# Patient Record
Sex: MALE | URBAN - METROPOLITAN AREA
[De-identification: names, ages, dates, MRNs, and addresses within clinical notes are randomized per-mention and may not be internally consistent; named-entity substitution may affect disease eponyms.]

---

## 2023-10-09 ENCOUNTER — HOSPITAL ENCOUNTER (OUTPATIENT)
Age: 23
Discharge: HOME | End: 2023-10-09

## 2023-10-09 PROCEDURE — 86481 TB AG RESPONSE T-CELL SUSP: CPT

## 2023-10-13 LAB — T-SPOT TB TEST: NORMAL

## 2025-03-24 SDOH — HEALTH STABILITY: PHYSICAL HEALTH: ON AVERAGE, HOW MANY MINUTES DO YOU ENGAGE IN EXERCISE AT THIS LEVEL?: 30 MIN

## 2025-03-24 SDOH — HEALTH STABILITY: PHYSICAL HEALTH: ON AVERAGE, HOW MANY DAYS PER WEEK DO YOU ENGAGE IN MODERATE TO STRENUOUS EXERCISE (LIKE A BRISK WALK)?: 1 DAY

## 2025-03-25 ENCOUNTER — OFFICE VISIT (OUTPATIENT)
Age: 25
End: 2025-03-25
Payer: COMMERCIAL

## 2025-03-25 ENCOUNTER — HOSPITAL ENCOUNTER (OUTPATIENT)
Age: 25
Setting detail: SPECIMEN
Discharge: HOME | End: 2025-03-25

## 2025-03-25 ENCOUNTER — HOSPITAL ENCOUNTER (OUTPATIENT)
Age: 25
Discharge: HOME | End: 2025-03-27
Payer: COMMERCIAL

## 2025-03-25 ENCOUNTER — HOSPITAL ENCOUNTER (OUTPATIENT)
Dept: GENERAL RADIOLOGY | Age: 25
Discharge: HOME | End: 2025-03-27
Payer: COMMERCIAL

## 2025-03-25 VITALS
BODY MASS INDEX: 41.18 KG/M2 | HEIGHT: 69 IN | RESPIRATION RATE: 16 BRPM | SYSTOLIC BLOOD PRESSURE: 120 MMHG | DIASTOLIC BLOOD PRESSURE: 77 MMHG | WEIGHT: 278 LBS | HEART RATE: 93 BPM | TEMPERATURE: 98.4 F

## 2025-03-25 DIAGNOSIS — J06.9 VIRAL URI: ICD-10-CM

## 2025-03-25 DIAGNOSIS — E78.5 HYPERLIPIDEMIA, UNSPECIFIED HYPERLIPIDEMIA TYPE: ICD-10-CM

## 2025-03-25 DIAGNOSIS — J06.9 VIRAL URI: Primary | ICD-10-CM

## 2025-03-25 DIAGNOSIS — I10 ELEVATED BLOOD PRESSURE READING IN OFFICE WITH DIAGNOSIS OF HYPERTENSION: ICD-10-CM

## 2025-03-25 LAB
ALBUMIN SERPL-MCNC: 4.4 G/DL (ref 3.5–5.2)
ALBUMIN/GLOB SERPL: 1.5 {RATIO} (ref 1–2.5)
ALP SERPL-CCNC: 112 U/L (ref 40–129)
ALT SERPL-CCNC: 88 U/L (ref 10–50)
ANION GAP SERPL CALCULATED.3IONS-SCNC: 11 MMOL/L (ref 9–16)
AST SERPL-CCNC: 39 U/L (ref 10–50)
BASOPHILS # BLD: 0.08 K/UL (ref 0–0.2)
BASOPHILS NFR BLD: 1 % (ref 0–2)
BILIRUB SERPL-MCNC: 1.9 MG/DL (ref 0–1.2)
BUN SERPL-MCNC: 9 MG/DL (ref 6–20)
CALCIUM SERPL-MCNC: 9.4 MG/DL (ref 8.6–10.4)
CHLORIDE SERPL-SCNC: 103 MMOL/L (ref 98–107)
CHOLEST SERPL-MCNC: 209 MG/DL (ref 0–199)
CHOLESTEROL/HDL RATIO: 6
CO2 SERPL-SCNC: 24 MMOL/L (ref 20–31)
CREAT SERPL-MCNC: 0.9 MG/DL (ref 0.7–1.2)
EOSINOPHIL # BLD: 0.09 K/UL (ref 0–0.44)
EOSINOPHILS RELATIVE PERCENT: 1 % (ref 1–4)
ERYTHROCYTE [DISTWIDTH] IN BLOOD BY AUTOMATED COUNT: 13.1 % (ref 11.8–14.4)
GFR, ESTIMATED: >90 ML/MIN/1.73M2
GLUCOSE SERPL-MCNC: 99 MG/DL (ref 74–99)
HCT VFR BLD AUTO: 43.3 % (ref 40.7–50.3)
HDLC SERPL-MCNC: 35 MG/DL
HGB BLD-MCNC: 14 G/DL (ref 13–17)
IMM GRANULOCYTES # BLD AUTO: 0.04 K/UL (ref 0–0.3)
IMM GRANULOCYTES NFR BLD: 1 %
LDLC SERPL CALC-MCNC: 147 MG/DL (ref 0–100)
LYMPHOCYTES NFR BLD: 1.31 K/UL (ref 1.1–3.7)
LYMPHOCYTES RELATIVE PERCENT: 20 % (ref 24–43)
MCH RBC QN AUTO: 26.4 PG (ref 25.2–33.5)
MCHC RBC AUTO-ENTMCNC: 32.3 G/DL (ref 28.4–34.8)
MCV RBC AUTO: 81.7 FL (ref 82.6–102.9)
MONOCYTES NFR BLD: 1.06 K/UL (ref 0.1–1.2)
MONOCYTES NFR BLD: 16 % (ref 3–12)
NEUTROPHILS NFR BLD: 61 % (ref 36–65)
NEUTS SEG NFR BLD: 3.92 K/UL (ref 1.5–8.1)
NRBC BLD-RTO: 0 PER 100 WBC
PLATELET # BLD AUTO: 216 K/UL (ref 138–453)
PMV BLD AUTO: 11 FL (ref 8.1–13.5)
POTASSIUM SERPL-SCNC: 4.1 MMOL/L (ref 3.7–5.3)
PROT SERPL-MCNC: 7.3 G/DL (ref 6.6–8.7)
RBC # BLD AUTO: 5.3 M/UL (ref 4.21–5.77)
RBC # BLD: ABNORMAL 10*6/UL
SODIUM SERPL-SCNC: 138 MMOL/L (ref 136–145)
TRIGL SERPL-MCNC: 134 MG/DL
VLDLC SERPL CALC-MCNC: 27 MG/DL (ref 1–30)
WBC OTHER # BLD: 6.5 K/UL (ref 3.5–11.3)

## 2025-03-25 PROCEDURE — 71046 X-RAY EXAM CHEST 2 VIEWS: CPT

## 2025-03-25 PROCEDURE — 3078F DIAST BP <80 MM HG: CPT

## 2025-03-25 PROCEDURE — 99203 OFFICE O/P NEW LOW 30 MIN: CPT

## 2025-03-25 PROCEDURE — 99202 OFFICE O/P NEW SF 15 MIN: CPT

## 2025-03-25 PROCEDURE — 3074F SYST BP LT 130 MM HG: CPT

## 2025-03-25 RX ORDER — HYDROXYZINE HYDROCHLORIDE 10 MG/1
10 TABLET, FILM COATED ORAL
COMMUNITY
Start: 2025-03-13

## 2025-03-25 RX ORDER — BLOOD PRESSURE TEST KIT
1 KIT MISCELLANEOUS DAILY
Qty: 1 KIT | Refills: 0 | Status: SHIPPED | OUTPATIENT
Start: 2025-03-25

## 2025-03-25 RX ORDER — TRIAMCINOLONE ACETONIDE 1 MG/G
OINTMENT TOPICAL
COMMUNITY
Start: 2025-03-12

## 2025-03-25 SDOH — ECONOMIC STABILITY: FOOD INSECURITY: WITHIN THE PAST 12 MONTHS, THE FOOD YOU BOUGHT JUST DIDN'T LAST AND YOU DIDN'T HAVE MONEY TO GET MORE.: NEVER TRUE

## 2025-03-25 SDOH — ECONOMIC STABILITY: FOOD INSECURITY: WITHIN THE PAST 12 MONTHS, YOU WORRIED THAT YOUR FOOD WOULD RUN OUT BEFORE YOU GOT MONEY TO BUY MORE.: NEVER TRUE

## 2025-03-25 ASSESSMENT — PATIENT HEALTH QUESTIONNAIRE - PHQ9
2. FEELING DOWN, DEPRESSED OR HOPELESS: SEVERAL DAYS
SUM OF ALL RESPONSES TO PHQ QUESTIONS 1-9: 1
1. LITTLE INTEREST OR PLEASURE IN DOING THINGS: NOT AT ALL
SUM OF ALL RESPONSES TO PHQ QUESTIONS 1-9: 1

## 2025-03-25 NOTE — PROGRESS NOTES
[facility]  [address] [place] [address]  Phone: [phone]  Fax: [phone]      Date of Visit: 3/25/2025   Patient Name: Cristian Holloway   Patient :  2000     ASSESSMENT/PLAN   1. Viral URI  -     XR CHEST STANDARD (2 VW); Future  -     CBC with Auto Differential; Future  2. Elevated blood pressure reading in office with diagnosis of hypertension  -     Blood Pressure KIT; DAILY Starting Tue 3/25/2025, Disp-1 kit, R-0, Print  3. Hyperlipidemia, unspecified hyperlipidemia type  -     Lipid Panel; Future  -     Comprehensive Metabolic Panel; Future       Patient Instructions   Thank you for following up with us at [facility] office. It was a pleasure to meet you today!     Our plan is the following:  As discussed I have ordered a chest x-ray for you to have done.  Please have this done at your convenience and I will follow-up with the results with you.  Additionally I would advise continued supportive care for your cough at this time. A cough can last for several weeks.  But if your symptoms worsen or you develop new concerning symptoms please give our office a call to be further evaluated or go to the nearest emergency department to be further evaluated.  Additionally have ordered some lab work which includes CBC, CMP, lipid panel please have this done at your convenience and I will review the results.  Additionally as discussed please track your blood pressures at home.  Revised taking blood pressures every other day once in the morning and once in the afternoon and we can review the results at your next visit.  That she make an appointment to come back in a month to have a full physical exam at that time.    Your medication list is included in the after visit summary. If you find any differences when compared to your medications at home, please contact the office ([phone]) to let us

## 2025-03-25 NOTE — PATIENT INSTRUCTIONS
Thank you for following up with us at [facility] office. It was a pleasure to meet you today!     Our plan is the following:  As discussed I have ordered a chest x-ray for you to have done.  Please have this done at your convenience and I will follow-up with the results with you.  Additionally I would advise continued supportive care for your cough at this time. A cough can last for several weeks.  But if your symptoms worsen or you develop new concerning symptoms please give our office a call to be further evaluated or go to the nearest emergency department to be further evaluated.  Additionally have ordered some lab work which includes CBC, CMP, lipid panel please have this done at your convenience and I will review the results.  Additionally as discussed please track your blood pressures at home.  Revised taking blood pressures every other day once in the morning and once in the afternoon and we can review the results at your next visit.  That she make an appointment to come back in a month to have a full physical exam at that time.    Your medication list is included in the after visit summary. If you find any differences when compared to your medications at home, please contact the office ([phone]) to let us know.   You can also call the office if you have any additional questions or concerns and speak to one of the staff. They will triage and forward the message to the provider.   Have a great rest of your day!

## 2025-03-26 ENCOUNTER — TELEPHONE (OUTPATIENT)
Age: 25
End: 2025-03-26

## 2025-03-26 NOTE — TELEPHONE ENCOUNTER
Patient calling to request a phone call with Dr. Mueller to talk about his lab results. States he had seen them on MyChart and was concerned. Please advise

## 2025-03-28 NOTE — TELEPHONE ENCOUNTER
Spoke to patient about his lab results. Went over his lipid panel, CMP, CBC, and chest x-ray results. Explained to patient his CMP and CBC results are grossly WNLs. His lipid panel showed a slightly high cholesterol of 209, HDL was 35, and LDL was 147. Per ASCVD risk algorithm no statin therapy is recommended at this time. Advised lifestyle modifications such as improving his diet, weight loss, increasing exercise.

## 2025-04-13 NOTE — PROGRESS NOTES
Exercise Vital Sign     Days of Exercise per Week: 1 day     Minutes of Exercise per Session: 30 min   Stress: Stress Concern Present (3/3/2025)    Received from [facility]    [employer] - Occupational Stress Questionnaire     Feeling of Stress : To some extent   Social Connections: Socially Isolated (3/3/2025)    Received from [facility]    Social Connection and Isolation Panel [NHANES]     Frequency of Communication with Friends and Family: More than three times a week     Frequency of Social Gatherings with Friends and Family: Once a week     Attends Sikhism Services: Never     Active Member of Clubs or Organizations: No     Attends Club or Organization Meetings: Never     Marital Status: Never    Housing Stability: Low Risk  (3/25/2025)    Housing Stability Vital Sign     Unable to Pay for Housing in the Last Year: No     Number of Times Moved in the Last Year: 0     Homeless in the Last Year: No

## 2025-04-15 ENCOUNTER — OFFICE VISIT (OUTPATIENT)
Age: 25
End: 2025-04-15
Payer: COMMERCIAL

## 2025-04-15 VITALS
DIASTOLIC BLOOD PRESSURE: 74 MMHG | HEIGHT: 69 IN | WEIGHT: 270 LBS | TEMPERATURE: 97.6 F | BODY MASS INDEX: 39.99 KG/M2 | HEART RATE: 83 BPM | SYSTOLIC BLOOD PRESSURE: 138 MMHG | RESPIRATION RATE: 18 BRPM

## 2025-04-15 DIAGNOSIS — Z23 NEED FOR HPV VACCINATION: ICD-10-CM

## 2025-04-15 DIAGNOSIS — Z87.19 HISTORY OF FATTY INFILTRATION OF LIVER: ICD-10-CM

## 2025-04-15 DIAGNOSIS — R17 TOTAL BILIRUBIN, ELEVATED: ICD-10-CM

## 2025-04-15 DIAGNOSIS — Z11.4 SCREENING FOR HIV (HUMAN IMMUNODEFICIENCY VIRUS): ICD-10-CM

## 2025-04-15 DIAGNOSIS — Z00.01 ENCOUNTER FOR WELL ADULT EXAM WITH ABNORMAL FINDINGS: Primary | ICD-10-CM

## 2025-04-15 DIAGNOSIS — Z11.3 SCREENING FOR STD (SEXUALLY TRANSMITTED DISEASE): ICD-10-CM

## 2025-04-15 PROCEDURE — 99212 OFFICE O/P EST SF 10 MIN: CPT

## 2025-04-15 PROCEDURE — 90651 9VHPV VACCINE 2/3 DOSE IM: CPT | Performed by: STUDENT IN AN ORGANIZED HEALTH CARE EDUCATION/TRAINING PROGRAM

## 2025-04-15 PROCEDURE — 99395 PREV VISIT EST AGE 18-39: CPT

## 2025-04-15 RX ORDER — BUSPIRONE HYDROCHLORIDE 10 MG/1
10 TABLET ORAL 2 TIMES DAILY
COMMUNITY
Start: 2025-04-01 | End: 2026-04-01

## 2025-04-15 RX ORDER — APREMILAST 30 MG/1
1 TABLET, FILM COATED ORAL 2 TIMES DAILY
COMMUNITY

## 2025-04-15 NOTE — PATIENT INSTRUCTIONS
Thank you for following up with us at [facility] office. It was a pleasure to meet you today!     Our plan is the following:  As discussed I have placed orders to screen for STDs given your history of having multiple sexual partners.  Additionally I have ordered blood work to further evaluate your history of fatty liver. I have ordered a hepatic function panel as well as a right upper quadrant ultrasound to examine your liver and gallbladder. Please have this done before your next visit so we can go over it.  Additionally, I would like you to follow-up in 1 month for follow-up of your lab work and imaging results.    Your medication list is included in the after visit summary. If you find any differences when compared to your medications at home, please contact the office ([phone]) to let us know.   You can also call the office if you have any additional questions or concerns and speak to one of the staff. They will triage and forward the message to the provider.   Have a great rest of your day!

## 2025-05-21 ENCOUNTER — HOSPITAL ENCOUNTER (OUTPATIENT)
Age: 25
Discharge: HOME | End: 2025-05-23
Payer: COMMERCIAL

## 2025-05-21 ENCOUNTER — HOSPITAL ENCOUNTER (OUTPATIENT)
Dept: ULTRASOUND IMAGING | Age: 25
Discharge: HOME | End: 2025-05-23
Payer: COMMERCIAL

## 2025-05-21 ENCOUNTER — HOSPITAL ENCOUNTER (OUTPATIENT)
Dept: GENERAL RADIOLOGY | Age: 25
Discharge: HOME | End: 2025-05-23
Payer: COMMERCIAL

## 2025-05-21 DIAGNOSIS — N20.0 CALCULUS OF KIDNEY: ICD-10-CM

## 2025-05-21 DIAGNOSIS — Z87.19 HISTORY OF FATTY INFILTRATION OF LIVER: ICD-10-CM

## 2025-05-21 PROCEDURE — 76705 ECHO EXAM OF ABDOMEN: CPT

## 2025-05-21 PROCEDURE — 74018 RADEX ABDOMEN 1 VIEW: CPT

## 2025-05-22 ENCOUNTER — HOSPITAL ENCOUNTER (OUTPATIENT)
Age: 25
Setting detail: SPECIMEN
Discharge: HOME | End: 2025-05-22

## 2025-05-22 DIAGNOSIS — Z11.4 SCREENING FOR HIV (HUMAN IMMUNODEFICIENCY VIRUS): ICD-10-CM

## 2025-05-22 DIAGNOSIS — Z87.19 HISTORY OF FATTY INFILTRATION OF LIVER: ICD-10-CM

## 2025-05-22 DIAGNOSIS — Z11.3 SCREENING FOR STD (SEXUALLY TRANSMITTED DISEASE): ICD-10-CM

## 2025-05-22 LAB
HIV 1+2 AB+HIV1 P24 AG SERPL QL IA: NONREACTIVE
T PALLIDUM AB SER QL IA: NONREACTIVE

## 2025-05-23 LAB
ALBUMIN SERPL-MCNC: 4.5 G/DL (ref 3.5–5.2)
ALBUMIN/GLOB SERPL: 1.7 {RATIO} (ref 1–2.5)
ALP SERPL-CCNC: 109 U/L (ref 40–129)
ALT SERPL-CCNC: 80 U/L (ref 10–50)
AST SERPL-CCNC: 31 U/L (ref 10–50)
BILIRUB DIRECT SERPL-MCNC: 0.4 MG/DL (ref 0–0.2)
BILIRUB INDIRECT SERPL-MCNC: 0.9 MG/DL (ref 0–1)
BILIRUB SERPL-MCNC: 1.3 MG/DL (ref 0–1.2)
CHLAMYDIA DNA UR QL NAA+PROBE: NEGATIVE
GLOBULIN SER CALC-MCNC: 2.6 G/DL
N GONORRHOEA DNA UR QL NAA+PROBE: NEGATIVE
PROT SERPL-MCNC: 7.1 G/DL (ref 6.6–8.7)
SPECIMEN DESCRIPTION: NORMAL

## 2025-05-27 ENCOUNTER — OFFICE VISIT (OUTPATIENT)
Age: 25
End: 2025-05-27
Payer: COMMERCIAL

## 2025-05-27 VITALS
SYSTOLIC BLOOD PRESSURE: 137 MMHG | TEMPERATURE: 97.9 F | RESPIRATION RATE: 14 BRPM | DIASTOLIC BLOOD PRESSURE: 75 MMHG | WEIGHT: 269.4 LBS | HEIGHT: 69 IN | HEART RATE: 96 BPM | BODY MASS INDEX: 39.9 KG/M2

## 2025-05-27 DIAGNOSIS — Z87.891 FORMER SMOKER: ICD-10-CM

## 2025-05-27 DIAGNOSIS — R03.0 ELEVATED BLOOD PRESSURE READING: ICD-10-CM

## 2025-05-27 DIAGNOSIS — K76.0 NON-ALCOHOLIC FATTY LIVER DISEASE: Primary | ICD-10-CM

## 2025-05-27 PROCEDURE — 99213 OFFICE O/P EST LOW 20 MIN: CPT

## 2025-05-27 ASSESSMENT — PATIENT HEALTH QUESTIONNAIRE - PHQ9
SUM OF ALL RESPONSES TO PHQ QUESTIONS 1-9: 0
1. LITTLE INTEREST OR PLEASURE IN DOING THINGS: NOT AT ALL
SUM OF ALL RESPONSES TO PHQ QUESTIONS 1-9: 0
2. FEELING DOWN, DEPRESSED OR HOPELESS: NOT AT ALL

## 2025-05-27 NOTE — PATIENT INSTRUCTIONS
Thank you for following up with us at [facility] office. It was a pleasure to meet you today!     Our plan is the following:  As discussed continue to make lifestyle changes. Recommend cutting down on fatty foods and eating out a lot. Would recommend following a healthier diet.  Attached some reading on DASH diet please look into this.  In addition I have ordered some additional blood work to rule out other causes of your fatty liver. Please have this blood work done and can review the results at your next visit.      Your medication list is included in the after visit summary. If you find any differences when compared to your medications at home, please contact the office ([phone]) to let us know.   You can also call the office if you have any additional questions or concerns and speak to one of the staff. They will triage and forward the message to the provider.   Have a great rest of your day!

## 2025-05-27 NOTE — PROGRESS NOTES
[facility]  [address] [place] [address]  Phone: [phone]  Fax: [phone]      Date of Visit: 2025   Patient Name: Cristian Holloway   Patient :  2000     ASSESSMENT/PLAN   1. Non-alcoholic fatty liver disease  -     MARICRUZ; Future  -     Smooth Muscle Antibody Quant; Future  -     Alpha-1-Antitrypsin; Future  -     Ceruloplasmin; Future  2. Elevated blood pressure reading       - Will have patient implement lifestyle modifications including diet and exercise to improve blood pressures.        - Will have patient continue to monitor blood pressures at home.        - Will evaluate blood pressures at follow up visit and if blood pressures remain elevated will consider starting antihypertensive medication and do further work up to rule out possible secondary cause of hypertension  3. Former smoker       Patient Instructions   Thank you for following up with us at [facility] office. It was a pleasure to meet you today!     Our plan is the following:  As discussed continue to make lifestyle changes. Recommend cutting down on fatty foods and eating out a lot. Would recommend following a healthier diet.  Attached some reading on DASH diet please look into this.  In addition I have ordered some additional blood work to rule out other causes of your fatty liver. Please have this blood work done and can review the results at your next visit.      Your medication list is included in the after visit summary. If you find any differences when compared to your medications at home, please contact the office ([phone]) to let us know.   You can also call the office if you have any additional questions or concerns and speak to one of the staff. They will triage and forward the message to the provider.   Have a great rest of your day!       Return in about 1 month (around 2025) for Blood pressure

## 2025-05-29 ENCOUNTER — HOSPITAL ENCOUNTER (OUTPATIENT)
Age: 25
Setting detail: SPECIMEN
Discharge: HOME | End: 2025-05-29

## 2025-05-29 DIAGNOSIS — K76.0 NON-ALCOHOLIC FATTY LIVER DISEASE: ICD-10-CM

## 2025-05-29 LAB
A1AT SERPL-MCNC: 149 MG/DL (ref 90–200)
ANA SER QL IA: NEGATIVE
CERULOPLASMIN SERPL-MCNC: 31 MG/DL (ref 15–30)
DSDNA IGG SER QL IA: <0.5 IU/ML
NUCLEAR IGG SER IA-RTO: <0.1 U/ML

## 2025-05-31 LAB — SMOOTH MUSCLE ANTIBODY: 4 UNITS (ref 0–19)

## 2025-07-24 ENCOUNTER — HOSPITAL ENCOUNTER (OUTPATIENT)
Age: 25
Setting detail: SPECIMEN
Discharge: HOME | End: 2025-07-24

## 2025-07-24 LAB
HBV SURFACE AB SERPL IA-ACNC: <3.5 MIU/ML
HBV SURFACE AG SERPL QL IA: NONREACTIVE
HCV AB SERPL QL IA: NONREACTIVE

## 2025-07-25 LAB
GAMMA INTERFERON BACKGROUND BLD IA-ACNC: 0.08 IU/ML
M TB IFN-G BLD-IMP: NEGATIVE IU/ML
M TB IFN-G CD4+ BCKGRND COR BLD-ACNC: 0.01 IU/ML (ref 0–0.34)
M TB IFN-G CD4+CD8+ BCKGRND COR BLD-ACNC: 0.02 IU/ML (ref 0–0.34)
MITOGEN IGNF BCKGRD COR BLD-ACNC: 9.92 IU/ML

## 2025-07-28 NOTE — PROGRESS NOTES
[facility]  [address] [place] [address]  Phone: [phone]  Fax: [phone]      Date of Visit: 2025   Patient Name: Cristian Holloway   Patient :  2000     ASSESSMENT/PLAN   1. Non-alcoholic fatty liver disease        -     Patient's workup for nonalcoholic fatty liver disease and elevated total bilirubin has been unrevealing so far.  -     [facility].  2. Class 3 severe obesity with serious comorbidity and body mass index (BMI) of 40.0 to 44.9 in adult, unspecified obesity type (HCC)  -    Will consider starting patient on Wegovy once he has been evaluated by gastroenterology for his elevated bilirubin.  3. Nonsmoker  4. Elevated blood pressure reading in office with diagnosis of hypertension   - Patient's initial blood pressure in office 147/57, repeat 128/61. Patient asymptomatic. Patient was advised to keep track of blood pressures after his previous office visit after implementing lifestyle changes. Patient has not been recording his blood pressures at home. Advised patient to log blood pressures at home after implementing lifestyle changes and bring in his blood pressure logs along with his blood pressure cuff to his next office visit to see how his baseline blood pressures are at home to see if he needs to be on any medication and to determine if further workup needs to be done.    Patient Instructions   Thank you for following up with us at [facility] office. It was a pleasure to meet you today!     Our plan is the following:  As discussed I have placed a referral to gastroenterology for further evaluation of your nonalcoholic fatty liver disease.  Please call and make an appointment to follow-up with them.  Additionally as discussed I have placed an order for Wegovy to help with weight management.  Please pick

## 2025-07-29 ENCOUNTER — TELEPHONE (OUTPATIENT)
Age: 25
End: 2025-07-29

## 2025-07-29 ENCOUNTER — OFFICE VISIT (OUTPATIENT)
Age: 25
End: 2025-07-29
Payer: COMMERCIAL

## 2025-07-29 VITALS
BODY MASS INDEX: 43.69 KG/M2 | TEMPERATURE: 97.6 F | HEIGHT: 69 IN | DIASTOLIC BLOOD PRESSURE: 61 MMHG | SYSTOLIC BLOOD PRESSURE: 128 MMHG | HEART RATE: 79 BPM | WEIGHT: 295 LBS | RESPIRATION RATE: 16 BRPM

## 2025-07-29 DIAGNOSIS — I10 ELEVATED BLOOD PRESSURE READING IN OFFICE WITH DIAGNOSIS OF HYPERTENSION: ICD-10-CM

## 2025-07-29 DIAGNOSIS — Z78.9 NONSMOKER: ICD-10-CM

## 2025-07-29 DIAGNOSIS — K76.0 NON-ALCOHOLIC FATTY LIVER DISEASE: Primary | ICD-10-CM

## 2025-07-29 DIAGNOSIS — E66.813 CLASS 3 SEVERE OBESITY WITH SERIOUS COMORBIDITY AND BODY MASS INDEX (BMI) OF 40.0 TO 44.9 IN ADULT, UNSPECIFIED OBESITY TYPE (HCC): ICD-10-CM

## 2025-07-29 PROBLEM — E66.9 OBESITY, UNSPECIFIED: Status: ACTIVE | Noted: 2025-07-29

## 2025-07-29 PROCEDURE — 3074F SYST BP LT 130 MM HG: CPT

## 2025-07-29 PROCEDURE — 3078F DIAST BP <80 MM HG: CPT

## 2025-07-29 PROCEDURE — 99213 OFFICE O/P EST LOW 20 MIN: CPT

## 2025-07-29 NOTE — TELEPHONE ENCOUNTER
Patient called and stated that his insurance does not cover for him to take the Wegovy, but he stated that [facility] is [facility] and pay out of pocket.

## 2025-07-29 NOTE — PATIENT INSTRUCTIONS
Thank you for following up with us at [facility] office. It was a pleasure to meet you today!     Our plan is the following:  As discussed I have placed a referral to gastroenterology for further evaluation of your nonalcoholic fatty liver disease.  Please call and make an appointment to follow-up with them.  Additionally as discussed I have placed an order for Wegovy to help with weight management.  Please pick this up and take as indicated.  Hopefully this gets covered by your insurance as you have stated that your insurance covers it for antiobesity medication.  It is very important that you are maintaining your protein intake as the GLP-1 will indiscriminately help you lose weight (so you are at risk of losing lean muscle as well as fat).  When eating, it is important to prioritize protein first and then veggies and then fruits so you are getting nutrition.  Strength training will also be important for you to do so that you are keeping your muscles. This will help keep your bones strong and help maintain a normal basal metabolic rate (BMR - also referred to as \"your metabolism\").  I recommend getting a smart scale which is a scale that can detect the percent body fat and percent skeletal muscle you have in addition to your weight. By having this type of a scale, you'll be able to see if you are losing skeletal muscle (which is your lean muscle). If we see that you are losing too much skeletal muscle, we need to make significant changes to your diet or decrease the dose of the GLP-1 to make sure you are staying healthy on your weight loss journey.     You had elevated blood pressure in the office today to. Please continue to log your blood pressures at home given you were recommended to incorporate lifestyle changes and diet at your last visit.  I would like to see how your blood pressures are at home with these changes. Please remember to bring your blood pressure logs and

## 2025-07-31 NOTE — TELEPHONE ENCOUNTER
Placed completed Buderer prescription for semaglutide in the resident outbox to be faxed. Please let patient know once it has been faxed.

## 2025-07-31 NOTE — TELEPHONE ENCOUNTER
I have completed his Buderer prescription and placed it in the resident outbox to be faxed. Please let patient know as well.

## 2025-07-31 NOTE — TELEPHONE ENCOUNTER
Disregard. Patient states it is taking too long to be sent to pharmacy. Patient is now asking for Buderer script to be sent per previous message. Will fax script once I get form from resident outbox.

## 2025-07-31 NOTE — TELEPHONE ENCOUNTER
Patient is now no longer wanting to get Buderer compound. States he would like to have Wegovy sent back to his pharmacy because he has a manufacturing coupon to use

## 2025-08-05 ENCOUNTER — OFFICE VISIT (OUTPATIENT)
Dept: GASTROENTEROLOGY | Age: 25
End: 2025-08-05
Payer: COMMERCIAL

## 2025-08-05 VITALS
WEIGHT: 291.6 LBS | BODY MASS INDEX: 43.19 KG/M2 | HEART RATE: 93 BPM | SYSTOLIC BLOOD PRESSURE: 152 MMHG | OXYGEN SATURATION: 96 % | HEIGHT: 69 IN | RESPIRATION RATE: 18 BRPM | DIASTOLIC BLOOD PRESSURE: 93 MMHG | TEMPERATURE: 97.5 F

## 2025-08-05 DIAGNOSIS — K76.0 NON-ALCOHOLIC FATTY LIVER DISEASE: Primary | ICD-10-CM

## 2025-08-05 PROCEDURE — 99204 OFFICE O/P NEW MOD 45 MIN: CPT | Performed by: INTERNAL MEDICINE

## 2025-08-05 PROCEDURE — 3080F DIAST BP >= 90 MM HG: CPT | Performed by: INTERNAL MEDICINE

## 2025-08-05 PROCEDURE — 3077F SYST BP >= 140 MM HG: CPT | Performed by: INTERNAL MEDICINE

## 2025-08-05 RX ORDER — MULTIVIT WITH MINERALS/LUTEIN
1000 TABLET ORAL DAILY
Qty: 30 CAPSULE | Refills: 1 | Status: SHIPPED | OUTPATIENT
Start: 2025-08-05

## 2025-08-26 ENCOUNTER — TELEPHONE (OUTPATIENT)
Age: 25
End: 2025-08-26

## 2025-09-02 RX ORDER — MULTIVIT WITH MINERALS/LUTEIN
TABLET ORAL DAILY
Qty: 90 CAPSULE | Refills: 1 | Status: SHIPPED | OUTPATIENT
Start: 2025-09-02